# Patient Record
Sex: MALE | Race: WHITE | URBAN - METROPOLITAN AREA
[De-identification: names, ages, dates, MRNs, and addresses within clinical notes are randomized per-mention and may not be internally consistent; named-entity substitution may affect disease eponyms.]

---

## 2017-01-03 RX ORDER — CLONAZEPAM 0.5 MG/1
TABLET ORAL
Qty: 60 TABLET | Refills: 0 | Status: SHIPPED | OUTPATIENT
Start: 2017-01-03 | End: 2017-03-21 | Stop reason: SDUPTHER

## 2017-01-26 ENCOUNTER — OFFICE VISIT (OUTPATIENT)
Dept: INTERNAL MEDICINE CLINIC | Age: 32
End: 2017-01-26

## 2017-01-26 VITALS
DIASTOLIC BLOOD PRESSURE: 76 MMHG | WEIGHT: 195 LBS | TEMPERATURE: 98.3 F | SYSTOLIC BLOOD PRESSURE: 118 MMHG | BODY MASS INDEX: 30.09 KG/M2 | HEART RATE: 80 BPM

## 2017-01-26 DIAGNOSIS — K52.9 IBD (INFLAMMATORY BOWEL DISEASE): ICD-10-CM

## 2017-01-26 DIAGNOSIS — K59.01 SLOW TRANSIT CONSTIPATION: ICD-10-CM

## 2017-01-26 DIAGNOSIS — Z00.00 PREVENTATIVE HEALTH CARE: ICD-10-CM

## 2017-01-26 DIAGNOSIS — F42.9 OCD (OBSESSIVE COMPULSIVE DISORDER): Primary | ICD-10-CM

## 2017-01-26 LAB
C-REACTIVE PROTEIN: 41.2 MG/L (ref 0–5.1)
CHOLESTEROL, TOTAL: 142 MG/DL (ref 0–199)
HCT VFR BLD CALC: 42.9 % (ref 40.5–52.5)
HDLC SERPL-MCNC: 31 MG/DL (ref 40–60)
HEMOGLOBIN: 13.6 G/DL (ref 13.5–17.5)
LDL CHOLESTEROL CALCULATED: 71 MG/DL
MCH RBC QN AUTO: 26.4 PG (ref 26–34)
MCHC RBC AUTO-ENTMCNC: 31.6 G/DL (ref 31–36)
MCV RBC AUTO: 83.6 FL (ref 80–100)
PDW BLD-RTO: 15.7 % (ref 12.4–15.4)
PLATELET # BLD: 306 K/UL (ref 135–450)
PMV BLD AUTO: 9.4 FL (ref 5–10.5)
RBC # BLD: 5.14 M/UL (ref 4.2–5.9)
SEDIMENTATION RATE, ERYTHROCYTE: 40 MM/HR (ref 0–15)
TRIGL SERPL-MCNC: 200 MG/DL (ref 0–150)
VLDLC SERPL CALC-MCNC: 40 MG/DL
WBC # BLD: 9.9 K/UL (ref 4–11)

## 2017-01-26 PROCEDURE — 99213 OFFICE O/P EST LOW 20 MIN: CPT | Performed by: INTERNAL MEDICINE

## 2017-01-26 RX ORDER — MELATONIN
1 DAILY
Qty: 30 TABLET | Refills: 5 | Status: SHIPPED | OUTPATIENT
Start: 2017-01-26 | End: 2018-04-25 | Stop reason: CLARIF

## 2017-01-26 RX ORDER — CLOMIPRAMINE HYDROCHLORIDE 25 MG/1
25 CAPSULE ORAL 2 TIMES DAILY
Qty: 120 CAPSULE | Refills: 3 | Status: SHIPPED | OUTPATIENT
Start: 2017-01-26

## 2017-01-26 RX ORDER — ANTACID TABLETS 500 MG/1
1 TABLET, CHEWABLE ORAL 2 TIMES DAILY WITH MEALS
Qty: 180 TABLET | Refills: 5 | Status: SHIPPED | OUTPATIENT
Start: 2017-01-26 | End: 2018-04-25 | Stop reason: CLARIF

## 2017-01-26 RX ORDER — POLYETHYLENE GLYCOL 3350 17 G/17G
17 POWDER ORAL DAILY
Qty: 1 BOTTLE | Refills: 5 | Status: SHIPPED | OUTPATIENT
Start: 2017-01-26 | End: 2018-04-25 | Stop reason: CLARIF

## 2017-01-26 ASSESSMENT — ENCOUNTER SYMPTOMS
CONSTIPATION: 1
RESPIRATORY NEGATIVE: 1
ANAL BLEEDING: 1

## 2017-01-27 LAB
ESTIMATED AVERAGE GLUCOSE: 119.8 MG/DL
HBA1C MFR BLD: 5.8 %

## 2017-02-14 ENCOUNTER — TELEPHONE (OUTPATIENT)
Dept: INTERNAL MEDICINE CLINIC | Age: 32
End: 2017-02-14

## 2017-03-21 RX ORDER — CLONAZEPAM 0.5 MG/1
TABLET ORAL
Qty: 60 TABLET | Refills: 0 | Status: SHIPPED | OUTPATIENT
Start: 2017-03-21 | End: 2017-04-20 | Stop reason: SDUPTHER

## 2017-04-20 RX ORDER — CLONAZEPAM 0.5 MG/1
TABLET ORAL
Qty: 60 TABLET | Refills: 0 | Status: SHIPPED | OUTPATIENT
Start: 2017-04-20 | End: 2017-06-16 | Stop reason: SDUPTHER

## 2017-06-16 RX ORDER — FLUTICASONE PROPIONATE 50 MCG
SPRAY, SUSPENSION (ML) NASAL
Qty: 1 BOTTLE | Refills: 5 | Status: SHIPPED | OUTPATIENT
Start: 2017-06-16

## 2017-06-16 RX ORDER — CLONAZEPAM 0.5 MG/1
TABLET ORAL
Qty: 60 TABLET | Refills: 0 | Status: SHIPPED | OUTPATIENT
Start: 2017-06-16

## 2018-04-25 ENCOUNTER — OFFICE VISIT (OUTPATIENT)
Dept: INTERNAL MEDICINE | Age: 33
End: 2018-04-25

## 2018-04-25 VITALS
SYSTOLIC BLOOD PRESSURE: 122 MMHG | WEIGHT: 199 LBS | BODY MASS INDEX: 30.16 KG/M2 | DIASTOLIC BLOOD PRESSURE: 78 MMHG | HEIGHT: 68 IN

## 2018-04-25 DIAGNOSIS — K50.014 CROHN'S DISEASE OF SMALL INTESTINE WITH ABSCESS (HCC): ICD-10-CM

## 2018-04-25 DIAGNOSIS — M45.7 ANKYLOSING SPONDYLITIS OF LUMBOSACRAL REGION (HCC): ICD-10-CM

## 2018-04-25 DIAGNOSIS — K50.019 CROHN'S DISEASE OF SMALL INTESTINE WITH COMPLICATION (HCC): Primary | ICD-10-CM

## 2018-04-25 DIAGNOSIS — Z13.5 ENCOUNTER FOR SCREENING FOR EYE AND EAR DISORDERS: ICD-10-CM

## 2018-04-25 PROCEDURE — 99202 OFFICE O/P NEW SF 15 MIN: CPT | Performed by: INTERNAL MEDICINE

## 2018-04-25 RX ORDER — CHOLECALCIFEROL (VITAMIN D3) 1250 MCG
1 CAPSULE ORAL
COMMUNITY

## 2018-04-25 ASSESSMENT — PATIENT HEALTH QUESTIONNAIRE - PHQ9
2. FEELING DOWN, DEPRESSED OR HOPELESS: 0
SUM OF ALL RESPONSES TO PHQ9 QUESTIONS 1 & 2: 0
SUM OF ALL RESPONSES TO PHQ QUESTIONS 1-9: 0
1. LITTLE INTEREST OR PLEASURE IN DOING THINGS: 0

## 2018-05-29 ENCOUNTER — TELEPHONE (OUTPATIENT)
Dept: INTERNAL MEDICINE | Age: 33
End: 2018-05-29

## 2018-05-29 DIAGNOSIS — K50.014 CROHN'S DISEASE OF SMALL INTESTINE WITH ABSCESS (HCC): ICD-10-CM

## 2018-05-29 DIAGNOSIS — Z00.00 WELL ADULT EXAM: Primary | ICD-10-CM

## 2018-07-27 ENCOUNTER — OFFICE VISIT (OUTPATIENT)
Dept: INTERNAL MEDICINE | Age: 33
End: 2018-07-27

## 2018-07-27 VITALS
HEIGHT: 68 IN | WEIGHT: 197 LBS | SYSTOLIC BLOOD PRESSURE: 120 MMHG | BODY MASS INDEX: 29.86 KG/M2 | DIASTOLIC BLOOD PRESSURE: 78 MMHG | HEART RATE: 101 BPM

## 2018-07-27 DIAGNOSIS — Z00.00 PHYSICAL EXAM: Primary | ICD-10-CM

## 2018-07-27 DIAGNOSIS — R53.83 FATIGUE, UNSPECIFIED TYPE: ICD-10-CM

## 2018-07-27 DIAGNOSIS — K50.019 CROHN'S DISEASE OF SMALL INTESTINE WITH COMPLICATION (HCC): ICD-10-CM

## 2018-07-27 DIAGNOSIS — N52.9 ERECTILE DYSFUNCTION, UNSPECIFIED ERECTILE DYSFUNCTION TYPE: ICD-10-CM

## 2018-07-27 DIAGNOSIS — M45.7 ANKYLOSING SPONDYLITIS OF LUMBOSACRAL REGION (HCC): ICD-10-CM

## 2018-07-27 PROCEDURE — 99395 PREV VISIT EST AGE 18-39: CPT | Performed by: INTERNAL MEDICINE

## 2018-07-27 RX ORDER — SILDENAFIL 100 MG/1
100 TABLET, FILM COATED ORAL PRN
Qty: 9 TABLET | Refills: 3 | Status: SHIPPED | OUTPATIENT
Start: 2018-07-27 | End: 2022-05-16

## 2018-07-27 NOTE — PROGRESS NOTES
BMP:    Lab Results   Component Value Date     07/11/2018    K 4.7 07/11/2018    CL 99 07/11/2018    CO2 27 07/11/2018       LFT's:   Lab Results   Component Value Date    ALT 29 07/11/2018    AST 16 07/11/2018    ALKPHOS 82 07/11/2018    BILITOT 0.3 07/11/2018       Lipids:   Lab Results   Component Value Date    CHOL 162 07/11/2018    HDL 37 (L) 07/11/2018    LDLCALC 92 07/11/2018    TRIG 166 (H) 07/11/2018       INR: No results found for: INR, PROTIME    U/A:  Lab Results   Component Value Date    LABMICR Comment 07/11/2018          Lab Results   Component Value Date    LABA1C 5.8 01/26/2017        Lab Results   Component Value Date    CREATININE 0.89 07/11/2018       -----------------------------------------------------------------     Assessment/Plan:   1. Crohn's disease of small intestine with complication ([address]  This problem is stable on present treatment followed by GI  - CBC WITH AUTO DIFFERENTIAL; Future  - Testosterone male; Future  - VITAMIN B12; Future  - VITAMIN D 25 HYDROXY; Future    2. Ankylosing spondylitis of lumbosacral region [facility])  His problem is stable on Remicade  - CBC WITH AUTO DIFFERENTIAL; Future  - Testosterone male; Future  - VITAMIN B12; Future  - VITAMIN D 25 HYDROXY; Future    3. Fatigue, unspecified type  Etiology unknown rule out a result of nutritional deficiencies rule out testosterone deficiency etc.  - CBC WITH AUTO DIFFERENTIAL; Future  - Testosterone male;  Future  - VITAMIN B12; Future  - VITAMIN D 25 HYDROXY; Future   Physical exam check screening labs continue to be followed by GI will give Viagra for erectile dysfunction and check testosterone level

## 2019-02-12 ENCOUNTER — TELEPHONE (OUTPATIENT)
Dept: INTERNAL MEDICINE CLINIC | Age: 34
End: 2019-02-12

## 2019-02-20 ENCOUNTER — TELEPHONE (OUTPATIENT)
Dept: INTERNAL MEDICINE CLINIC | Age: 34
End: 2019-02-20

## 2019-04-12 ENCOUNTER — TELEPHONE (OUTPATIENT)
Dept: INTERNAL MEDICINE CLINIC | Age: 34
End: 2019-04-12

## 2019-11-22 ENCOUNTER — TELEPHONE (OUTPATIENT)
Dept: INTERNAL MEDICINE CLINIC | Age: 34
End: 2019-11-22

## 2020-06-30 ENCOUNTER — TELEPHONE (OUTPATIENT)
Dept: INTERNAL MEDICINE CLINIC | Age: 35
End: 2020-06-30

## 2020-11-05 ENCOUNTER — OFFICE VISIT (OUTPATIENT)
Dept: INTERNAL MEDICINE CLINIC | Age: 35
End: 2020-11-05
Payer: COMMERCIAL

## 2020-11-05 VITALS — SYSTOLIC BLOOD PRESSURE: 126 MMHG | BODY MASS INDEX: 30.4 KG/M2 | HEIGHT: 68 IN | DIASTOLIC BLOOD PRESSURE: 88 MMHG

## 2020-11-05 PROBLEM — K76.0 FATTY LIVER: Status: ACTIVE | Noted: 2020-11-05

## 2020-11-05 PROBLEM — K40.90 NON-RECURRENT UNILATERAL INGUINAL HERNIA WITHOUT OBSTRUCTION OR GANGRENE: Status: ACTIVE | Noted: 2020-11-05

## 2020-11-05 PROCEDURE — 99395 PREV VISIT EST AGE 18-39: CPT | Performed by: INTERNAL MEDICINE

## 2020-11-05 NOTE — PROGRESS NOTES
Annual Wellness Visit     Patient:  Angelina Manuel                                               : 1985  Age: 30 y.o. MRN: <[id number]>  Date : 2020      CHIEF COMPLAINT: Angelina Manuel is a 30 y.o. male who presents for : Physical exam    1. Obsessive-compulsive disorder, unspecified type  This problem is stable followed by psychiatry    2. Crohn's disease of small intestine with complication ([facility] [address]  Problem has been well controlled now on Remicade and followed by GI he also has been newly diagnosed as having a fatty liver and is being worked up by Dr. Cyn Gamino; Future  - SEDIMENTATION RATE; Future  - VITAMIN B12; Future  - VITAMIN D 25 HYDROXY; Future    3. Fatty liver  As above    4. PE (physical exam), annual  Only feels good denies any chest pain shortness of breath or any other problems  - CBC Auto Differential; Future  - Comprehensive Metabolic Panel; Future  - Lipid Panel; Future  - Psa screening; Future  - TSH without Reflex; Future  - Urinalysis;  Future        Patient Active Problem List    Diagnosis Date Noted    Fatty liver 2020    Ankylosing spondylitis of lumbosacral region [facility]) 2018    OCD (obsessive compulsive disorder) 2017    IBD (inflammatory bowel disease) 2017    Regional enteritis of small intestine ([facility] [address] 2016       Constitutional:  Denies fever or chills   Eyes:  Denies change in visual acuity   HENT:  Denies nasal congestion or sore throat   Respiratory:  Denies cough or shortness of breath   Cardiovascular:  Denies chest pain or edema   GI:  Denies abdominal pain, nausea, vomiting, bloody stools or diarrhea   :  Denies dysuria   Musculoskeletal:  Denies back pain or joint pain   Integument:  Denies rash   Neurologic:  Denies headache, focal weakness or sensory changes   Endocrine:  Denies polyuria or polydipsia   Lymphatic:  Denies swollen glands   Psychiatric:  Denies depression or anxiety     Past Medical History: Diagnosis Date    Ankylosing spondylitis lumbar region [facility])     Ankylosing spondylitis of lumbosacral region ([facility] 2018    Anxiety     OCD    Crohn's disease ([facility]     Depression     Fatty liver 2020    GERD (gastroesophageal reflux disease)     OCD (obsessive compulsive disorder)     Small bowel obstruction (HCC)        Past Surgical History:        Procedure Laterality Date    COLONOSCOPY         Family History:  No family history on file. Social History:  Social History     Socioeconomic History    Marital status:      Spouse name: None    Number of children: 3    Years of education: None    Highest education level: None   Occupational History    None   Social Needs    Financial resource strain: None    Food insecurity     Worry: None     Inability: None    Transportation needs     Medical: None     Non-medical: None   Tobacco Use    Smoking status: Former Smoker     Types: Cigarettes     Start date: 8/15/2008     Last attempt to quit: 1/15/2013     Years since quittin.8    Smokeless tobacco: Never Used    Tobacco comment: former smoker-light smoker,2-3 cigarettes/day   Substance and Sexual Activity    Alcohol use:  Yes     Alcohol/week: 1.0 standard drinks     Types: 1 Cans of beer per week     Comment: one beer a month     Drug use: No    Sexual activity: Yes     Comment:    Lifestyle    Physical activity     Days per week: None     Minutes per session: None    Stress: None   Relationships    Social connections     Talks on phone: None     Gets together: None     Attends Hinduism service: None     Active member of club or organization: None     Attends meetings of clubs or organizations: None     Relationship status: None    Intimate partner violence     Fear of current or ex partner: None     Emotionally abused: None     Physically abused: None     Forced sexual activity: None   Other Topics Concern    None   Social History Narrative    None Allergies:  Patient has no known allergies. Current Medications:    Prior to Admission medications    Medication Sig Start Date End Date Taking? Authorizing Provider   sildenafil (VIAGRA) 100 MG tablet Take 1 tablet by mouth as needed for Erectile Dysfunction 7/27/18   Elvin Hernandez MD   Cholecalciferol (VITAMIN D3) 30592 units CAPS Take 1 capsule by mouth every 7 days    Historical MD Atul   clonazePAM (KLONOPIN) 0.5 MG tablet TAKE ONE TABLET BY MOUTH TWICE DAILY AS NEEDED FOR ANXIETY 6/16/17   Konstantin Rosales MD   fluticasone [facility]) 50 MCG/ACT nasal spray USE ONE SPRAY(S) IN EACH NOSTRIL TWICE DAILY 6/16/17   Konstantin Rosales MD   clomiPRAMINE (ANAFRANIL) 25 MG capsule Take 1 capsule by mouth 2 times daily 1/26/17   Konstantin Rosales MD   FLUoxetine (PROZAC) 40 MG capsule Take 2 capsules by mouth daily 9/22/16   Konstantin Rosales MD   inFLIXimab (REMICADE) 100 MG injection Infuse 5 mg/kg intravenously See Admin Instructions Every 6 weeks    Historical Provider, MD           Physical Exam:      Constitutional:  Well developed, well nourished, no acute distress, non-toxic appearance   Eyes:  PERRL, conjunctiva normal   HENT:  Atraumatic, external ears normal, nose normal, oropharynx moist, no pharyngeal exudates. Neck- normal range of motion, no tenderness, supple   Respiratory:  No respiratory distress, normal breath sounds, no rales, no wheezing   Cardiovascular:  Normal rate, normal rhythm, no murmurs, no gallops, no rubs   GI:  Soft, nondistended, normal bowel sounds, nontender, no organomegaly, no mass, no rebound, no guarding   :  No costovertebral angle tenderness easily reducible left inguinal hernia  Musculoskeletal:  No edema, no tenderness, no deformities.  Back- no tenderness  Integument:  Well hydrated, no rash   Lymphatic:  No lymphadenopathy noted   Neurologic:  Alert & oriented x 3, CN 2-12 normal, normal motor function, normal sensory function, no focal deficits noted Psychiatric:  Speech and behavior appropriate       Vitals: /88   Ht 5' 7.5\" (1.715 m)   BMI 30.40 kg/m²     Body mass index is 30.4 kg/m². Wt Readings from Last 3 Encounters:   07/27/18 197 lb (89.4 kg)   04/25/18 199 lb (90.3 kg)   01/26/17 195 lb (88.5 kg)         LABS:    CBC:   Lab Results   Component Value Date    WBC 13.5 (H) 08/02/2018    HGB 13.7 08/02/2018    HCT 43.1 08/02/2018    MCV 86 08/02/2018     08/02/2018           Lab Results   Component Value Date    DTCXJIXH93 349 08/02/2018                                                             BMP:    Lab Results   Component Value Date     07/11/2018    K 4.7 07/11/2018    CL 99 07/11/2018    CO2 27 07/11/2018       LFT's:   Lab Results   Component Value Date    ALT 29 07/11/2018    AST 16 07/11/2018    ALKPHOS 82 07/11/2018    BILITOT 0.3 07/11/2018       Lipids:   Lab Results   Component Value Date    CHOL 162 07/11/2018    HDL 37 (L) 07/11/2018    LDLCALC 92 07/11/2018    TRIG 166 (H) 07/11/2018       INR: No results found for: INR, PROTIME    U/A:  Lab Results   Component Value Date    LABMICR Comment 07/11/2018          Lab Results   Component Value Date    LABA1C 5.8 01/26/2017        Lab Results   Component Value Date    CREATININE 0.89 07/11/2018       -----------------------------------------------------------------     Assessment/Plan:   1. Obsessive-compulsive disorder, unspecified type  This problem is stable continue present meds    2. Crohn's disease of small intestine with complication ([address]  This problem is stable followed by GI currently on Remicade  - C-REACTIVE PROTEIN; Future  - SEDIMENTATION RATE; Future  - VITAMIN B12; Future  - VITAMIN D 25 HYDROXY; Future    3. Fatty liver  This problem is stable currently being worked up by hepatology strongly encouraged to lose about 10% of his weight    4.  PE (physical exam), annual  Check screening labs he is up-to-date on all his immunizations continue diet and

## 2020-12-02 ENCOUNTER — TELEPHONE (OUTPATIENT)
Dept: INTERNAL MEDICINE CLINIC | Age: 35
End: 2020-12-02

## 2020-12-04 ENCOUNTER — TELEPHONE (OUTPATIENT)
Dept: INTERNAL MEDICINE CLINIC | Age: 35
End: 2020-12-04

## 2020-12-04 NOTE — TELEPHONE ENCOUNTER
Waiting on results to come by fax. Results reviewed. Spoke with spouse regarding results. See liver specialist.    Watch diet. Can see dietician.

## 2021-04-16 ENCOUNTER — TELEPHONE (OUTPATIENT)
Dept: INTERNAL MEDICINE CLINIC | Age: 36
End: 2021-04-16

## 2021-04-16 NOTE — TELEPHONE ENCOUNTER
Per physician    Should go to [facility] ( patient's gi specialist Sushant Izquierdo) for treatment of covid. Spouse notified.

## 2021-04-16 NOTE — TELEPHONE ENCOUNTER
Patient wife called stating patient just tested positive today for COVID 19. He had a rapid test done at his work. They want to know what he recommends? Please call they have a lot of questions, and needs to know if he needs to antibody treatment? She was told if he does he needs to get this as soon as possible . Please call to advise.

## 2021-04-21 ENCOUNTER — OFFICE VISIT (OUTPATIENT)
Dept: INTERNAL MEDICINE CLINIC | Age: 36
End: 2021-04-21
Payer: COMMERCIAL

## 2021-04-21 VITALS — HEART RATE: 96 BPM

## 2021-04-21 DIAGNOSIS — U07.1 COVID-19 VIRUS INFECTION: Primary | ICD-10-CM

## 2021-04-21 PROCEDURE — 99213 OFFICE O/P EST LOW 20 MIN: CPT | Performed by: INTERNAL MEDICINE

## 2021-04-21 SDOH — ECONOMIC STABILITY: TRANSPORTATION INSECURITY
IN THE PAST 12 MONTHS, HAS THE LACK OF TRANSPORTATION KEPT YOU FROM MEDICAL APPOINTMENTS OR FROM GETTING MEDICATIONS?: NO

## 2021-04-21 SDOH — ECONOMIC STABILITY: TRANSPORTATION INSECURITY
IN THE PAST 12 MONTHS, HAS LACK OF TRANSPORTATION KEPT YOU FROM MEETINGS, WORK, OR FROM GETTING THINGS NEEDED FOR DAILY LIVING?: NO

## 2021-04-21 SDOH — ECONOMIC STABILITY: INCOME INSECURITY: HOW HARD IS IT FOR YOU TO PAY FOR THE VERY BASICS LIKE FOOD, HOUSING, MEDICAL CARE, AND HEATING?: NOT HARD AT ALL

## 2021-04-21 SDOH — ECONOMIC STABILITY: FOOD INSECURITY: WITHIN THE PAST 12 MONTHS, THE FOOD YOU BOUGHT JUST DIDN'T LAST AND YOU DIDN'T HAVE MONEY TO GET MORE.: NEVER TRUE

## 2021-04-21 ASSESSMENT — PATIENT HEALTH QUESTIONNAIRE - PHQ9
1. LITTLE INTEREST OR PLEASURE IN DOING THINGS: 0
SUM OF ALL RESPONSES TO PHQ QUESTIONS 1-9: 0
SUM OF ALL RESPONSES TO PHQ QUESTIONS 1-9: 0
2. FEELING DOWN, DEPRESSED OR HOPELESS: 0

## 2021-04-21 NOTE — PROGRESS NOTES
2021    TELEHEALTH EVALUATION -- Audio/Visual (During UBYUI-72 public health emergency)    HPI: Follow-up COVID-19 infection status post monoclonal antibody infusion    Tatyana Balbuena (:  1985) has requested an audio/video evaluation for the following concern(s):    Follow-up COVID-19 infection he went to [facility] with chest x-ray showed early pneumonia he is having some productive cough but feels better he did get the monoclonal antibodies 2 days later she had no fever chills his pulse ox is remained over 96%  He did have the Frost Peter vaccine in January  Review of Systems no fevers chills shortness of breath    Prior to Visit Medications    Medication Sig Taking? Authorizing Provider   sildenafil (VIAGRA) 100 MG tablet Take 1 tablet by mouth as needed for Erectile Dysfunction Yes Gary Guillen MD   Cholecalciferol (VITAMIN D3) 08179 units CAPS Take 1 capsule by mouth every 7 days Yes Historical Provider, MD   clonazePAM (KLONOPIN) 0.5 MG tablet TAKE ONE TABLET BY MOUTH TWICE DAILY AS NEEDED FOR ANXIETY Yes Joshua Mccormack MD   fluticasone (FLONASE) 50 MCG/ACT nasal spray USE ONE SPRAY(S) IN EACH NOSTRIL TWICE DAILY Yes Joshua Mccormack MD   clomiPRAMINE (ANAFRANIL) 25 MG capsule Take 1 capsule by mouth 2 times daily Yes Joshua Mccormack MD   FLUoxetine (PROZAC) 40 MG capsule Take 2 capsules by mouth daily Yes Joshua Mccormack MD   inFLIXimab (REMICADE) 100 MG injection Infuse 5 mg/kg intravenously See Admin Instructions Every 6 weeks Yes Historical Provider, MD       Social History     Tobacco Use    Smoking status: Former Smoker     Types: Cigarettes     Start date: 8/15/2008     Quit date: 1/15/2013     Years since quittin.2    Smokeless tobacco: Never Used    Tobacco comment: former smoker-light smoker,2-3 cigarettes/day   Substance Use Topics    Alcohol use:  Yes     Alcohol/week: 1.0 standard drinks     Types: 1 Cans of beer per week     Comment: one beer a month     Drug use: No        Past Medical History:   Diagnosis Date    [facility]yl[facility]ond[facility] lumbar [email or web address]     Ankylosing spondylitis of lumbosacral region ([email or web address] 4/25/2018    Anxiety     OCD    Crohn's disease ([email or web address]     Depression     Fatty liver 11/5/2020    GERD (gastroesophageal reflux disease)     OCD (obsessive compulsive disorder)     Small bowel obstruction (HCC)        PHYSICAL EXAMINATION:  [ INSTRUCTIONS:  \"[x]\" Indicates a positive item  \"[]\" Indicates a negative item  -- DELETE ALL ITEMS NOT EXAMINED]  Vital Signs: (As obtained by patient/caregiver or practitioner observation)    Blood pressure-  Heart rate-    Respiratory rate-    Temperature-  Pulse oximetry-     Constitutional: [x] Appears well-developed and well-nourished [x] No apparent distress      [] Abnormal-   Mental status  [x] Alert and awake  [x] Oriented to person/place/time []Able to follow commands      Eyes:  EOM    []  Normal  [] Abnormal-  Sclera  []  Normal  [] Abnormal -         Discharge []  None visible  [] Abnormal -    HENT:   [x] Normocephalic, atraumatic.   [] Abnormal   [] Mouth/Throat: Mucous membranes are moist.     External Ears [] Normal  [] Abnormal-     Neck: [] No visualized mass     Pulmonary/Chest: [x] Respiratory effort normal.  [x] No visualized signs of difficulty breathing or respiratory distress        [] Abnormal-      Musculoskeletal:   [] Normal gait with no signs of ataxia         [] Normal range of motion of neck        [] Abnormal-       Neurological:        [x] No Facial Asymmetry (Cranial nerve 7 motor function) (limited exam to video visit)          [] No gaze palsy        [] Abnormal-         Skin:        [] No significant exanthematous lesions or discoloration noted on facial skin         [] Abnormal-            Psychiatric:       [x] Normal Affect [] No Hallucinations        [] Abnormal-     Other pertinent observable physical exam findings-     ASSESSMENT/PLAN:  COVID-19 viral infection now clinically improved with evidence of pneumonitis but no other significant problems status post immunotherapy status post having the vaccine about 2 months earlier he was instructed to isolate for minimum of 10 days to use Robitussin-DM and Tylenol and supportive care as needed and to call if his pulse ox gets below 92% in the interim will report his case to the health department as he had completion of the vaccine and got a documented infection        Marvin Cotton, was evaluated through a synchronous (real-time) audio-video encounter. The patient (or guardian if applicable) is aware that this is a billable service. Verbal consent to proceed has been obtained within the past 12 months. The visit was conducted pursuant to the emergency declaration under the [address] [place] [address] authority and the [employer] Act. Patient identification was verified, and a caregiver was present when appropriate. The patient was located in a state where the provider was credentialed to provide care. Total time spent on this encounter: Not billed by time    --Fátima Loya MD on 4/21/2021 at 5:05 PM    An electronic signature was used to authenticate this note.

## 2021-05-12 ENCOUNTER — TELEPHONE (OUTPATIENT)
Dept: INTERNAL MEDICINE CLINIC | Age: 36
End: 2021-05-12

## 2021-05-12 NOTE — TELEPHONE ENCOUNTER
Non-Urgent Medical Question    From   Livier Carty To   [facility] Practice Support Sent   5/12/2021 11:42 AM   Hi, I will be travelling to [facility] soon, and for re-entry to the [address] [place] [address],[address], I need documentation that I have recently recovered from covid. (I tested positive on April 16, 2021.) This is the exact language from the airlines:     \". .. a signed letter on letterhead from a licensed healthcare provider or public health official that the passenger has recovered from [phone] and is cleared for travel. \"     Can you please provide? Thank you!

## 2021-05-12 NOTE — TELEPHONE ENCOUNTER
Divine  as long as it has been 10 day since symptoms and is symptom free    Spoke to spouse, will email letter to her. Patient is symptom free and has been for over 10 days. Letter completed.

## 2021-05-12 NOTE — LETTER
[facility] [address]  [address] [address]  Phone: [phone]  Fax: [phone]    Angelica Neil MD        May 14, 2021     Patient: Joce Tena   YOB: 1985   Date of Visit: 04/21/2021       To Whom It May Concern:    My patient Joce Tena has recovered from having Covid-19 and he is medically cleared to travel. If you have any questions or concerns, please don't hesitate to call.     Sincerely,        Angelica Neil MD   State License # [id number]

## 2021-07-16 ENCOUNTER — TELEPHONE (OUTPATIENT)
Dept: INTERNAL MEDICINE CLINIC | Age: 36
End: 2021-07-16

## 2021-07-16 NOTE — TELEPHONE ENCOUNTER
Spoke to pt spouse, informed that ABX will be sent it. She states that she wants him to be tested first. Advised that he would need to be seen in urgent care or go to a [facility] testing site to get swabbed. Declined to have med's sent in. Will call back if needed.

## 2021-07-16 NOTE — TELEPHONE ENCOUNTER
Patient wife called stating patient thinks he has strept throat. He just has a bad sore throat and no other symptoms. They believe he has strept and wants him to get tested for this since he has Crohn disease and does not want to take an antibiotic unless he has too! Please call to advise.

## 2021-09-15 ENCOUNTER — TELEPHONE (OUTPATIENT)
Dept: INTERNAL MEDICINE CLINIC | Age: 36
End: 2021-09-15

## 2021-09-15 NOTE — TELEPHONE ENCOUNTER
Pt's wife called into the office to request a call back from office regarding the following information. She declined to give anymore information about the medication questions. clonazePAM (KLONOPIN) 0.5 MG tablet     fluticasone (FLONASE) 50 MCG/ACT nasal spray     clomiPRAMINE (ANAFRANIL) 25 MG capsule         Please advise.

## 2021-10-01 ENCOUNTER — TELEPHONE (OUTPATIENT)
Dept: INTERNAL MEDICINE CLINIC | Age: 36
End: 2021-10-01

## 2021-10-01 NOTE — TELEPHONE ENCOUNTER
Non-Urgent Medical Question    Yecenia Shahid MD 25 minutes ago (2:00 PM)   ER  Viola Canchola Nissa Solomon had his Frost Peter shots in January 2021. He is still on Remicade every 6 weeks and got sick with covid in April 2021 and had the monoclonal infusion. He now has an opportunity to get the booster shot. Would you recommend it or is it not necessary because of his April illness and antibody infusion?

## 2022-03-09 LAB
ALBUMIN SERPL-MCNC: 4.8 G/DL
ALBUMIN SERPL-MCNC: NORMAL G/DL
ALP BLD-CCNC: 50 U/L
ALP BLD-CCNC: 50 U/L
ALT SERPL-CCNC: 33 U/L
ALT SERPL-CCNC: 33 U/L
ANION GAP SERPL CALCULATED.3IONS-SCNC: NORMAL MMOL/L
ANION GAP SERPL CALCULATED.3IONS-SCNC: NORMAL MMOL/L
AST SERPL-CCNC: 19 U/L
AST SERPL-CCNC: 19 U/L
AVERAGE GLUCOSE: NORMAL
BILIRUB SERPL-MCNC: 0.3 MG/DL (ref 0.1–1.4)
BILIRUB SERPL-MCNC: 0.3 MG/DL (ref 0.1–1.4)
BUN BLDV-MCNC: 19 MG/DL
BUN BLDV-MCNC: 19 MG/DL
CALCIUM SERPL-MCNC: NORMAL MG/DL
CALCIUM SERPL-MCNC: NORMAL MG/DL
CHLORIDE BLD-SCNC: NORMAL MMOL/L
CHLORIDE BLD-SCNC: NORMAL MMOL/L
CHOLESTEROL, TOTAL: 174 MG/DL
CHOLESTEROL/HDL RATIO: 3.6
CO2: NORMAL
CO2: NORMAL
CREAT SERPL-MCNC: 1 MG/DL
CREAT SERPL-MCNC: 1 MG/DL
CREATININE, URINE: 14
GFR CALCULATED: NORMAL
GFR CALCULATED: NORMAL
GLUCOSE BLD-MCNC: NORMAL MG/DL
GLUCOSE BLD-MCNC: NORMAL MG/DL
HBA1C MFR BLD: 5.4 %
HDLC SERPL-MCNC: 48 MG/DL (ref 35–70)
LDL CHOLESTEROL CALCULATED: 100 MG/DL (ref 0–160)
MICROALBUMIN/CREAT 24H UR: NORMAL MG/G{CREAT}
MICROALBUMIN/CREAT UR-RTO: NORMAL
NONHDLC SERPL-MCNC: NORMAL MG/DL
POTASSIUM SERPL-SCNC: NORMAL MMOL/L
POTASSIUM SERPL-SCNC: NORMAL MMOL/L
SODIUM BLD-SCNC: NORMAL MMOL/L
SODIUM BLD-SCNC: NORMAL MMOL/L
TOTAL PROTEIN: 7.9
TOTAL PROTEIN: 7.9
TRIGL SERPL-MCNC: 128 MG/DL
VLDLC SERPL CALC-MCNC: NORMAL MG/DL

## 2022-03-18 ENCOUNTER — PATIENT MESSAGE (OUTPATIENT)
Dept: INTERNAL MEDICINE CLINIC | Age: 37
End: 2022-03-18

## 2022-03-22 ENCOUNTER — TELEPHONE (OUTPATIENT)
Dept: INTERNAL MEDICINE CLINIC | Age: 37
End: 2022-03-22

## 2022-03-22 NOTE — TELEPHONE ENCOUNTER
Blayne Avery MD 4 days ago     ER      Hi, I just had to do some bloodwork for a life insurance evaluation and everything came back normal besides for 2 things:  Urine creatinine - 14mg/dl  Protein/Creatinine Ratio - 0.21 mg/mg     Do I need to be concerned about these?

## 2022-03-22 NOTE — TELEPHONE ENCOUNTER
Dom Hernandez MD 13 minutes ago (2:27 PM)     ER      It's normal.         You  Dary Emeli Martin 22 minutes ago (2:18 PM)     MW      Dr. Spike Garibay wants to know how your blood pressure is          Dom Hernandez MD 2 hours ago (12:28 PM)     ER      Hi, making sure you saw this message?          Dom Hernandez MD 4 days ago     ER      Hi, I just had to do some bloodwork for a life insurance evaluation and everything came back normal besides for 2 things:  Urine creatinine - 14mg/dl  Protein/Creatinine Ratio - 0.21 mg/mg     Do I need to be concerned about these

## 2022-05-16 ENCOUNTER — OFFICE VISIT (OUTPATIENT)
Dept: INTERNAL MEDICINE CLINIC | Age: 37
End: 2022-05-16
Payer: COMMERCIAL

## 2022-05-16 VITALS
DIASTOLIC BLOOD PRESSURE: 74 MMHG | OXYGEN SATURATION: 97 % | WEIGHT: 189 LBS | HEIGHT: 67 IN | BODY MASS INDEX: 29.66 KG/M2 | HEART RATE: 97 BPM | SYSTOLIC BLOOD PRESSURE: 114 MMHG

## 2022-05-16 DIAGNOSIS — R93.7 ABNORMAL BONE DENSITY SCREENING: ICD-10-CM

## 2022-05-16 DIAGNOSIS — K50.019 CROHN'S DISEASE OF SMALL INTESTINE WITH COMPLICATION (HCC): Primary | ICD-10-CM

## 2022-05-16 DIAGNOSIS — E55.9 VITAMIN D DEFICIENCY: ICD-10-CM

## 2022-05-16 DIAGNOSIS — E53.8 B12 DEFICIENCY: ICD-10-CM

## 2022-05-16 DIAGNOSIS — Z80.9 FAMILY HISTORY OF CANCER: ICD-10-CM

## 2022-05-16 PROCEDURE — 99214 OFFICE O/P EST MOD 30 MIN: CPT | Performed by: INTERNAL MEDICINE

## 2022-05-16 RX ORDER — INFLIXIMAB 100 MG/10ML
5 INJECTION, POWDER, LYOPHILIZED, FOR SOLUTION INTRAVENOUS SEE ADMIN INSTRUCTIONS
Qty: 1 EACH | Refills: 4
Start: 2022-05-16

## 2022-05-16 RX ORDER — LANOLIN ALCOHOL/MO/W.PET/CERES
1000 CREAM (GRAM) TOPICAL DAILY
Qty: 30 TABLET | Refills: 3 | Status: CANCELLED | OUTPATIENT
Start: 2022-05-16

## 2022-05-16 SDOH — ECONOMIC STABILITY: FOOD INSECURITY: WITHIN THE PAST 12 MONTHS, YOU WORRIED THAT YOUR FOOD WOULD RUN OUT BEFORE YOU GOT MONEY TO BUY MORE.: NEVER TRUE

## 2022-05-16 SDOH — ECONOMIC STABILITY: FOOD INSECURITY: WITHIN THE PAST 12 MONTHS, THE FOOD YOU BOUGHT JUST DIDN'T LAST AND YOU DIDN'T HAVE MONEY TO GET MORE.: NEVER TRUE

## 2022-05-16 ASSESSMENT — PATIENT HEALTH QUESTIONNAIRE - PHQ9
SUM OF ALL RESPONSES TO PHQ9 QUESTIONS 1 & 2: 0
1. LITTLE INTEREST OR PLEASURE IN DOING THINGS: 0
2. FEELING DOWN, DEPRESSED OR HOPELESS: 0
SUM OF ALL RESPONSES TO PHQ QUESTIONS 1-9: 0

## 2022-05-16 ASSESSMENT — SOCIAL DETERMINANTS OF HEALTH (SDOH): HOW HARD IS IT FOR YOU TO PAY FOR THE VERY BASICS LIKE FOOD, HOUSING, MEDICAL CARE, AND HEATING?: NOT HARD AT ALL

## 2022-05-16 NOTE — PROGRESS NOTES
Lakesha Pérez (:  1985) is a 40 y.o. male, here for evaluation of the following chief complaint(s):    Established New Doctor      ASSESSMENT/PLAN:  1. Crohn's disease of small intestine with complication ([facility] [address]  -    Since surgery, stable on inFLIXimab (REMICADE) 100 MG injection; Infuse 40 mLs intravenously See Admin Instructions, Disp-1 each, R-4NO PRINT  -     DEXA BONE DENSITY AXIAL SKELETON; Future  2. Family history of cancer  -     [facility]  3. Abnormal bone density screening  -     DEXA BONE DENSITY AXIAL SKELETON; Future  4. Vitamin D deficiency  Takes Vitamin D 50,000 units weekly  5. B12 deficiency  -    Add  cyanocobalamin 1000 MCG tablet; Take 1 tablet by mouth daily, Disp-90 tablet, R-1OTC  6. OCD-stable on clomipramine, fluoxetine, clonazepam.  He will discuss further with his psychiatrist the possible side effects of reduced libido. Return in about 1 year (around 2023). SUBJECTIVE/OBJECTIVE:  HPI   Patient is here to transfer care. He has a history of Crohn's disease, diagnosed approximately . He had abdominal surgery in 2019 and has felt much better since that time. He is on Remicade to maintain remission. He sees Dr. Larry Wise. He maintains a strict paleo diet. He used to have significant back pain until his Crohn's was treated. He has a regular psychiatrist-Dr. Juan Kinsey for his OCD. He feels the symptoms are well controlled on current medications but he does have a side effect of reduced libido. Patient states his weight is fairly stable and he does not have significant abdominal pain. Reviewed life insurance labs.  Overall appear within normal limits      Past Medical History:   Diagnosis Date    Allergic rhinitis     Ankylosing spondylitis of lumbosacral region ([address] 2018    improved    Anxiety     OCD    Crohn's disease ([facility] [address]     dx'd approx - Kreines    Depression     Fatty liver 2020    GERD (gastroesophageal reflux disease)     OCD (obsessive compulsive disorder)     dr Pat Puri    Small bowel obstruction [facility])        Current Outpatient Medications   Medication Sig Dispense Refill    cyanocobalamin 1000 MCG tablet Take 1 tablet by mouth daily 90 tablet 1    inFLIXimab (REMICADE) 100 MG injection Infuse 40 mLs intravenously See Admin Instructions 1 each 4    Cholecalciferol (VITAMIN D3) 24088 units CAPS Take 1 capsule by mouth every 7 days      clonazePAM (KLONOPIN) 0.5 MG tablet TAKE ONE TABLET BY MOUTH TWICE DAILY AS NEEDED FOR ANXIETY 60 tablet 0    fluticasone (FLONASE) 50 MCG/ACT nasal spray USE ONE SPRAY(S) IN EACH NOSTRIL TWICE DAILY 1 Bottle 5    clomiPRAMINE (ANAFRANIL) 25 MG capsule Take 1 capsule by mouth 2 times daily (Patient taking differently: Take 25 mg by mouth 3 times daily ) 120 capsule 3    FLUoxetine (PROZAC) 40 MG capsule Take 2 capsules by mouth daily 120 capsule 5     No current facility-administered medications for this visit. Physical Exam  Vitals and nursing note reviewed. Constitutional:       General: He is not in acute distress. Appearance: He is well-developed. HENT:      Head: Normocephalic and atraumatic. Right Ear: External ear normal.      Left Ear: External ear normal.   Eyes:      General: No scleral icterus. Extraocular Movements: Extraocular movements intact. Neck:      Thyroid: No thyromegaly. Cardiovascular:      Rate and Rhythm: Normal rate and regular rhythm. Heart sounds: No murmur heard. Pulmonary:      Effort: No respiratory distress. Breath sounds: Normal breath sounds. No wheezing or rales. Abdominal:      General: Bowel sounds are normal. There is no distension. Palpations: Abdomen is soft. Tenderness: There is no abdominal tenderness. Comments: Mid abdomen scar   Musculoskeletal:         General: No deformity. Normal range of motion. Cervical back: Normal range of motion. Right lower leg: No edema. Left lower leg: No edema. Lymphadenopathy:      Cervical: No cervical adenopathy. Skin:     General: Skin is warm and dry. Neurological:      Mental Status: He is alert and oriented to person, place, and time. Cranial Nerves: No cranial nerve deficit. Sensory: No sensory deficit. Coordination: Coordination normal.   Psychiatric:         Thought Content: Thought content normal.               This note was generated completely or in part utilizing Dragon dictation speech recognition software. Occasionally, words are mistranscribed and despite editing, the text may contain inaccuracies due to incorrect word recognition. If further clarification is needed please contact the office at [phone]          An electronic signature was used to authenticate this note.     --Amy Herrera MD

## 2022-05-17 PROBLEM — K50.90 CROHN'S DISEASE (HCC): Status: ACTIVE | Noted: 2022-05-17

## 2023-10-23 ENCOUNTER — TELEPHONE (OUTPATIENT)
Dept: INTERNAL MEDICINE CLINIC | Age: 38
End: 2023-10-23

## 2023-10-23 NOTE — TELEPHONE ENCOUNTER
Patient requesting an appointment for Rash. Advised to submit an evisit questionnaire.        Appointment Request From: Dyllan Estevez      With Provider: Osman Tinoco MD [facility]      Preferred Date Range: Any date 11/2/2023 or later      Preferred Times: Any Time      Reason for visit: Office Visit      Comments:   skin rash

## 2024-01-11 ENCOUNTER — TELEMEDICINE (OUTPATIENT)
Dept: INTERNAL MEDICINE CLINIC | Age: 39
End: 2024-01-11
Payer: COMMERCIAL

## 2024-01-11 ENCOUNTER — TELEPHONE (OUTPATIENT)
Dept: INTERNAL MEDICINE CLINIC | Age: 39
End: 2024-01-11

## 2024-01-11 DIAGNOSIS — R68.89 FLU-LIKE SYMPTOMS: Primary | ICD-10-CM

## 2024-01-11 DIAGNOSIS — J10.1 INFLUENZA A: Primary | ICD-10-CM

## 2024-01-11 LAB
INFLUENZA A ANTIBODY: POSITIVE
INFLUENZA B ANTIBODY: NEGATIVE
S PYO AG THROAT QL: NORMAL

## 2024-01-11 PROCEDURE — 99213 OFFICE O/P EST LOW 20 MIN: CPT | Performed by: INTERNAL MEDICINE

## 2024-01-11 PROCEDURE — 87804 INFLUENZA ASSAY W/OPTIC: CPT | Performed by: INTERNAL MEDICINE

## 2024-01-11 PROCEDURE — 87880 STREP A ASSAY W/OPTIC: CPT | Performed by: INTERNAL MEDICINE

## 2024-01-11 RX ORDER — ALBUTEROL SULFATE 90 UG/1
2 AEROSOL, METERED RESPIRATORY (INHALATION) 4 TIMES DAILY PRN
Qty: 18 G | Refills: 5 | Status: SHIPPED | OUTPATIENT
Start: 2024-01-11

## 2024-01-11 RX ORDER — BUDESONIDE AND FORMOTEROL FUMARATE DIHYDRATE 160; 4.5 UG/1; UG/1
2 AEROSOL RESPIRATORY (INHALATION) 2 TIMES DAILY
Qty: 1 EACH | Refills: 0 | Status: SHIPPED | OUTPATIENT
Start: 2024-01-11

## 2024-01-11 NOTE — TELEPHONE ENCOUNTER
If he can stop by before 5 PM for a nurse visit, we can do a flu test, and then he should do a VV with me at 5 PM today.

## 2024-01-11 NOTE — TELEPHONE ENCOUNTER
Patient is calling with concerns of fever, tightness in chest, wheezing, HA, achiness & nauseous.  He has taken several Covid tests and was negative. Please advise on scheduling.   Can contact patient at the number provider or his wife at [phone]

## 2024-01-11 NOTE — PROGRESS NOTES
Emeli Worthy (:  1985) is a 38 y.o. male,Established patient, here for evaluation of the following chief complaint(s): Cough and Congestion      ASSESSMENT/PLAN:  1. Influenza A  Likely too late to get effectiveness from Tamiflu. Advised rest, fluids, Tylenol as needed.  -     albuterol sulfate HFA (VENTOLIN HFA) 108 (90 Base) MCG/ACT inhaler; Inhale 2 puffs into the lungs 4 times daily as needed for Wheezing, Disp-18 g, R-5Normal  -     budesonide-formoterol (SYMBICORT) 160-4.5 MCG/ACT AERO; Inhale 2 puffs into the lungs 2 times daily, Disp-1 each, R-0Normal      Return if symptoms worsen or fail to improve.    SUBJECTIVE/OBJECTIVE:  HPI  Patient started feeling unwell this past Saturday with headaches, then developed on /Monday achiness and back pain and chest tightness and wheezing and sore throat, congestion and shivers. Thought it was a bad cold but feels terrible. Covid test negative today. Flu A test here positive.    Has Crohn's related infusion on 1/15 pending.    Review of Systems         No data to display                  Past Medical History:   Diagnosis Date    Allergic rhinitis     Ankylosing spondylitis of lumbosacral region ([facility] 2018    improved    Anxiety     OCD    COVID-19 2021    Crohn's disease ([facility])     dx'd approx - Kreines    Depression     Fatty liver 2020    GERD (gastroesophageal reflux disease)     OCD (obsessive compulsive disorder)     dr agustina winchester    Small bowel obstruction ([facility])        Current Outpatient Medications   Medication Sig Dispense Refill    albuterol sulfate HFA (VENTOLIN HFA) 108 (90 Base) MCG/ACT inhaler Inhale 2 puffs into the lungs 4 times daily as needed for Wheezing 18 g 5    budesonide-formoterol (SYMBICORT) 160-4.5 MCG/ACT AERO Inhale 2 puffs into the lungs 2 times daily 1 each 0    cyanocobalamin 1000 MCG tablet Take 1 tablet by mouth daily 90 tablet 1    inFLIXimab (REMICADE) 100 MG injection Infuse 40 mLs intravenously

## 2024-01-11 NOTE — PATIENT INSTRUCTIONS
Send mychart message with home vitals including temp, pulse ox, pulse    Albuterol or symbicort (preferred)

## 2024-06-24 ASSESSMENT — PATIENT HEALTH QUESTIONNAIRE - PHQ9
SUM OF ALL RESPONSES TO PHQ QUESTIONS 1-9: 0
SUM OF ALL RESPONSES TO PHQ QUESTIONS 1-9: 0
SUM OF ALL RESPONSES TO PHQ9 QUESTIONS 1 & 2: 0
2. FEELING DOWN, DEPRESSED OR HOPELESS: NOT AT ALL
SUM OF ALL RESPONSES TO PHQ9 QUESTIONS 1 & 2: 0
SUM OF ALL RESPONSES TO PHQ QUESTIONS 1-9: 0
1. LITTLE INTEREST OR PLEASURE IN DOING THINGS: NOT AT ALL
SUM OF ALL RESPONSES TO PHQ QUESTIONS 1-9: 0
2. FEELING DOWN, DEPRESSED OR HOPELESS: NOT AT ALL
1. LITTLE INTEREST OR PLEASURE IN DOING THINGS: NOT AT ALL

## 2024-06-27 ENCOUNTER — OFFICE VISIT (OUTPATIENT)
Dept: INTERNAL MEDICINE CLINIC | Age: 39
End: 2024-06-27
Payer: COMMERCIAL

## 2024-06-27 VITALS
HEART RATE: 96 BPM | WEIGHT: 196 LBS | SYSTOLIC BLOOD PRESSURE: 120 MMHG | HEIGHT: 68 IN | OXYGEN SATURATION: 97 % | BODY MASS INDEX: 29.7 KG/M2 | DIASTOLIC BLOOD PRESSURE: 82 MMHG

## 2024-06-27 DIAGNOSIS — K50.919 CROHN'S DISEASE WITH COMPLICATION, UNSPECIFIED GASTROINTESTINAL TRACT LOCATION (HCC): ICD-10-CM

## 2024-06-27 DIAGNOSIS — Z71.89 ACP (ADVANCE CARE PLANNING): Primary | ICD-10-CM

## 2024-06-27 PROCEDURE — 99395 PREV VISIT EST AGE 18-39: CPT | Performed by: INTERNAL MEDICINE

## 2024-06-27 SDOH — ECONOMIC STABILITY: INCOME INSECURITY: HOW HARD IS IT FOR YOU TO PAY FOR THE VERY BASICS LIKE FOOD, HOUSING, MEDICAL CARE, AND HEATING?: NOT HARD AT ALL

## 2024-06-27 SDOH — ECONOMIC STABILITY: FOOD INSECURITY: WITHIN THE PAST 12 MONTHS, THE FOOD YOU BOUGHT JUST DIDN'T LAST AND YOU DIDN'T HAVE MONEY TO GET MORE.: NEVER TRUE

## 2024-06-27 SDOH — ECONOMIC STABILITY: FOOD INSECURITY: WITHIN THE PAST 12 MONTHS, YOU WORRIED THAT YOUR FOOD WOULD RUN OUT BEFORE YOU GOT MONEY TO BUY MORE.: NEVER TRUE

## 2024-06-27 SDOH — ECONOMIC STABILITY: HOUSING INSECURITY
IN THE LAST 12 MONTHS, WAS THERE A TIME WHEN YOU DID NOT HAVE A STEADY PLACE TO SLEEP OR SLEPT IN A SHELTER (INCLUDING NOW)?: NO

## 2024-06-27 NOTE — PROGRESS NOTES
Signs  /82   Pulse 96   Ht 1.727 m (5' 8\")   Wt 88.9 kg (196 lb)   SpO2 97%   BMI 29.80 kg/m²   Wt Readings from Last 3 Encounters:   06/27/24 88.9 kg (196 lb)   05/16/22 85.7 kg (189 lb)   07/27/18 89.4 kg (197 lb)       Waist Circumference  There were no vitals filed for this visit.    Physical Exam  Vitals and nursing note reviewed.   Constitutional:       General: He is not in acute distress.     Appearance: He is well-developed.   HENT:      Head: Normocephalic and atraumatic.      Right Ear: External ear normal.      Left Ear: External ear normal.      Nose: Nose normal.   Eyes:      General: No scleral icterus.     Pupils: Pupils are equal, round, and reactive to light.   Neck:      Thyroid: No thyromegaly.   Cardiovascular:      Rate and Rhythm: Normal rate and regular rhythm.      Heart sounds: No murmur heard.  Pulmonary:      Effort: No respiratory distress.      Breath sounds: Normal breath sounds. No wheezing or rales.   Abdominal:      General: Bowel sounds are normal. There is no distension.      Palpations: Abdomen is soft.      Tenderness: There is no abdominal tenderness.   Musculoskeletal:         General: No deformity. Normal range of motion.      Cervical back: Normal range of motion.   Lymphadenopathy:      Cervical: No cervical adenopathy.   Skin:     General: Skin is warm and dry.      Findings: No rash (advised pt to send photos of the rash).   Neurological:      Mental Status: He is alert and oriented to person, place, and time.      Cranial Nerves: No cranial nerve deficit.      Sensory: No sensory deficit.      Coordination: Coordination normal.   Psychiatric:         Thought Content: Thought content normal.         Assessment   Plan   1. Encounter for well adult exam without abnormal findings  Age and gender appropriate preventive healthcare addressed.  2. Crohn's disease  Stable on Remicade  3. Depression/OCD  Under the care of his psychiatrist.  Stable on clomipramine,

## 2024-06-27 NOTE — PATIENT INSTRUCTIONS
health. Try to stay connected with friends, family, and community, and find ways to manage stress.     If you're feeling depressed or hopeless, talk to someone. A counselor can help. If you don't have a counselor, talk to your doctor.   Talk to your doctor if you think you may have a problem with alcohol or drug use. This includes prescription medicines, marijuana, and other drugs.     Avoid tobacco and nicotine: Don't smoke, vape, or chew. If you need help quitting, talk to your doctor.   Practice safer sex. Getting tested, using condoms or dental dams, and limiting sex partners can help prevent STIs.     Use birth control if it's important to you to prevent pregnancy. Talk with your doctor about your choices and what might be best for you.   Prevent problems where you can. Protect your skin from too much sun, wash your hands, brush your teeth twice a day, and wear a seat belt in the car.   Where can you learn more?  Go to https://www.[employer].cn.net/patientEd and enter P072 to learn more about \"Well Visit, Ages 18 to 65: Care Instructions.\"  Current as of: August 6, 2023  Content Version: 14.1  © 9862-8589 [employer].   Care instructions adapted under license by [employer]. If you have questions about a medical condition or this instruction, always ask your healthcare professional. [employer] disclaims any warranty or liability for your use of this information.

## 2024-07-02 ASSESSMENT — ENCOUNTER SYMPTOMS
TROUBLE SWALLOWING: 0
SHORTNESS OF BREATH: 0
RHINORRHEA: 0
DIARRHEA: 0
COUGH: 0
NAUSEA: 0
SORE THROAT: 0
ABDOMINAL PAIN: 0

## 2025-02-19 ENCOUNTER — OFFICE VISIT (OUTPATIENT)
Dept: INTERNAL MEDICINE CLINIC | Age: 40
End: 2025-02-19
Payer: COMMERCIAL

## 2025-02-19 VITALS
SYSTOLIC BLOOD PRESSURE: 112 MMHG | WEIGHT: 199 LBS | HEART RATE: 107 BPM | OXYGEN SATURATION: 97 % | TEMPERATURE: 97.2 F | DIASTOLIC BLOOD PRESSURE: 74 MMHG | BODY MASS INDEX: 30.26 KG/M2

## 2025-02-19 DIAGNOSIS — K50.919 CROHN'S DISEASE WITH COMPLICATION, UNSPECIFIED GASTROINTESTINAL TRACT LOCATION (HCC): ICD-10-CM

## 2025-02-19 DIAGNOSIS — H57.89 REDNESS OF LEFT EYE: Primary | ICD-10-CM

## 2025-02-19 PROCEDURE — 99213 OFFICE O/P EST LOW 20 MIN: CPT | Performed by: INTERNAL MEDICINE

## 2025-02-19 RX ORDER — TOBRAMYCIN AND DEXAMETHASONE 3; 1 MG/ML; MG/ML
1 SUSPENSION/ DROPS OPHTHALMIC
Qty: 2.5 ML | Refills: 0 | Status: SHIPPED | OUTPATIENT
Start: 2025-02-19 | End: 2025-03-01

## 2025-02-19 SDOH — ECONOMIC STABILITY: FOOD INSECURITY: WITHIN THE PAST 12 MONTHS, THE FOOD YOU BOUGHT JUST DIDN'T LAST AND YOU DIDN'T HAVE MONEY TO GET MORE.: NEVER TRUE

## 2025-02-19 SDOH — ECONOMIC STABILITY: FOOD INSECURITY: WITHIN THE PAST 12 MONTHS, YOU WORRIED THAT YOUR FOOD WOULD RUN OUT BEFORE YOU GOT MONEY TO BUY MORE.: NEVER TRUE

## 2025-02-19 ASSESSMENT — PATIENT HEALTH QUESTIONNAIRE - PHQ9
SUM OF ALL RESPONSES TO PHQ QUESTIONS 1-9: 0
SUM OF ALL RESPONSES TO PHQ9 QUESTIONS 1 & 2: 0
SUM OF ALL RESPONSES TO PHQ QUESTIONS 1-9: 0
1. LITTLE INTEREST OR PLEASURE IN DOING THINGS: NOT AT ALL
SUM OF ALL RESPONSES TO PHQ QUESTIONS 1-9: 0
SUM OF ALL RESPONSES TO PHQ QUESTIONS 1-9: 0
2. FEELING DOWN, DEPRESSED OR HOPELESS: NOT AT ALL

## 2025-02-19 NOTE — PROGRESS NOTES
Emeli Worthy (:  1985) is a 40 y.o. male, here for evaluation of the following chief complaint(s):    Conjunctivitis (Patient feels he had \"pink eye\" x 2 weeks ago & neighbor (PA-C) gave him Rx drops..4:35 PM reappeared. )      ASSESSMENT/PLAN:  1. Redness of left eye  Possible viral cause for pink eye, but also may be uveitis with his history of Crohn's, ankylosing spondylitis.  Sent Tobradex eye drops if symptoms persist or worsen and encouraged [facility] to further evaluate   -     Mariah Duong MD, [facility]  2. Crohn's disease with complication, unspecified gastrointestinal tract location ([facility])  -     Mariah Duong MD, [facility]      Return if symptoms worsen or fail to improve.    SUBJECTIVE/OBJECTIVE:  HPI  Patient reports an episode of pink eye in December. Got drops from a PA for conjunctivitis. Noted very red left eye again this morning.    One of kids had high fever a few days ago.   Patient has had mild cough.    Denies eye pain or vision changes.    Review of Systems    Past Medical History:   Diagnosis Date    Allergic rhinitis     Ankylosing spondylitis of lumbosacral region ([facility]) 2018    improved    Anxiety     OCD    COVID-19 2021    Crohn's disease ([facility])     dx'd approx - Kreines    Depression     Fatty liver 2020    GERD (gastroesophageal reflux disease)     OCD (obsessive compulsive disorder)     dr agustina winchester    Small bowel obstruction ([facility])        Current Outpatient Medications   Medication Sig Dispense Refill    tobramycin-dexAMETHasone (TOBRADEX) 0.3-0.1 % ophthalmic suspension Place 1 drop into the left eye every 4 hours (while awake) for 10 days 2.5 mL 0    cyanocobalamin 1000 MCG tablet Take 1 tablet by mouth daily 90 tablet 1    inFLIXimab (REMICADE) 100 MG injection Infuse 40 mLs intravenously See Admin

## 2025-02-19 NOTE — PATIENT INSTRUCTIONS
Appointments are scheduled within 72 hours or as appropriate for more acute cases. Appointments can be scheduled by calling ’s main number at [phone].     Urgent eye clinic at [facility]    Or see Dr Taylor at [facility]

## 2025-04-01 ENCOUNTER — OFFICE VISIT (OUTPATIENT)
Dept: INTERNAL MEDICINE CLINIC | Age: 40
End: 2025-04-01
Payer: COMMERCIAL

## 2025-04-01 VITALS
OXYGEN SATURATION: 95 % | BODY MASS INDEX: 30.29 KG/M2 | HEART RATE: 107 BPM | SYSTOLIC BLOOD PRESSURE: 108 MMHG | DIASTOLIC BLOOD PRESSURE: 88 MMHG | WEIGHT: 199.2 LBS

## 2025-04-01 DIAGNOSIS — H93.11 TINNITUS OF RIGHT EAR: Primary | ICD-10-CM

## 2025-04-01 DIAGNOSIS — H61.21 IMPACTED CERUMEN OF RIGHT EAR: ICD-10-CM

## 2025-04-01 PROCEDURE — 99213 OFFICE O/P EST LOW 20 MIN: CPT | Performed by: INTERNAL MEDICINE

## 2025-04-01 RX ORDER — PREDNISOLONE ACETATE 10 MG/ML
1 SUSPENSION/ DROPS OPHTHALMIC DAILY
COMMUNITY
Start: 2025-03-05

## 2025-04-01 NOTE — PROGRESS NOTES
Emeli Worthy (:  1985) is a 40 y.o. male, here for evaluation of the following chief complaint(s):    Ears (Pain and sensitive to noise, ringing on and off. Tranfers back and fourth from left ear to right. Tylenol last night and thinks it helped a little bit)      ASSESSMENT/PLAN:  1. Tinnitus of right ear -patient will let me know if it does not resolve after cerumen disimpaction.  We did discuss masking techniques, B vitamins, bioflavonoids.  If symptoms persist, may refer to ENT  2. Impacted cerumen of right ear  Resolved with irrigation    Return in about 3 months (around 2025) for butr.    SUBJECTIVE/OBJECTIVE:  HPI  Patient notes fullness of his right ear and ear pain.  Also notes ear ringing.  He states loud noises make it worse.  He denies headache or dizziness.  He denies fever or sore throat.  Symptoms have been worse over the past day.    He also notes his inflammation labs have been elevated.  His gastroenterologist may want to change his Crohn's medication.    His iritis is improved with prednisolone eyedrops.      Review of Systems    Past Medical History:   Diagnosis Date    Allergic rhinitis     Ankylosing spondylitis of lumbosacral region ([facility]) 2018    improved    Anxiety     OCD    COVID-19 2021    hla b27 pos    Crohn's disease ([facility])     dx'd approx - Kreines    Depression     Fatty liver 2020    GERD (gastroesophageal reflux disease)     OCD (obsessive compulsive disorder)     dr agustina winchester    Small bowel obstruction ([facility])     Uveitis        Current Outpatient Medications   Medication Sig Dispense Refill    prednisoLONE acetate (PRED FORTE) 1 % ophthalmic suspension Place 1 drop into the left eye daily      cyanocobalamin 1000 MCG tablet Take 1 tablet by mouth daily 90 tablet 1    inFLIXimab (REMICADE) 100 MG injection Infuse 40 mLs intravenously See Admin Instructions 1 each 4    Cholecalciferol (VITAMIN D3) 79563 units CAPS Take 1 capsule by mouth every

## 2025-04-03 ENCOUNTER — TELEPHONE (OUTPATIENT)
Dept: INTERNAL MEDICINE CLINIC | Age: 40
End: 2025-04-03

## 2025-04-03 NOTE — TELEPHONE ENCOUNTER
----- Message from Bryant LEWIS sent at 4/3/2025  8:41 AM EDT -----  Regarding: [facility] Appointment Request  [facility] Appointment Request    Patient needs appointment for [facility] Appointment Type: New to Provider.    Patient Requested Dates(s):the week of mathew 8  Patient Requested Time:early morning  Provider Name:Tj Howell DO      Reason for Appointment Request: New Patient - Requested Provider unavailable and the pcp of the patient told doctor Tj Howell DO about the patient    --------------------------------------------------------------------------------------------------------------------------    Relationship to Patient: Spouse/Partner/Chasya    Call Back Information: OK to leave message on voicemail  Preferred Call Back Number:[phone]

## 2025-07-06 SDOH — HEALTH STABILITY: PHYSICAL HEALTH: ON AVERAGE, HOW MANY MINUTES DO YOU ENGAGE IN EXERCISE AT THIS LEVEL?: 0 MIN

## 2025-07-06 SDOH — HEALTH STABILITY: PHYSICAL HEALTH: ON AVERAGE, HOW MANY DAYS PER WEEK DO YOU ENGAGE IN MODERATE TO STRENUOUS EXERCISE (LIKE A BRISK WALK)?: 0 DAYS

## 2025-07-08 ENCOUNTER — OFFICE VISIT (OUTPATIENT)
Dept: INTERNAL MEDICINE CLINIC | Age: 40
End: 2025-07-08
Payer: COMMERCIAL

## 2025-07-08 VITALS
SYSTOLIC BLOOD PRESSURE: 102 MMHG | WEIGHT: 196 LBS | DIASTOLIC BLOOD PRESSURE: 70 MMHG | BODY MASS INDEX: 29.7 KG/M2 | TEMPERATURE: 97.2 F | OXYGEN SATURATION: 95 % | HEART RATE: 90 BPM | HEIGHT: 68 IN

## 2025-07-08 DIAGNOSIS — K50.919 CROHN'S DISEASE WITH COMPLICATION, UNSPECIFIED GASTROINTESTINAL TRACT LOCATION (HCC): ICD-10-CM

## 2025-07-08 DIAGNOSIS — Z11.59 NEED FOR HEPATITIS C SCREENING TEST: ICD-10-CM

## 2025-07-08 DIAGNOSIS — Z13.1 SCREENING FOR DIABETES MELLITUS: ICD-10-CM

## 2025-07-08 DIAGNOSIS — Z00.00 ANNUAL PHYSICAL EXAM: Primary | ICD-10-CM

## 2025-07-08 DIAGNOSIS — Z13.220 SCREENING FOR HYPERLIPIDEMIA: ICD-10-CM

## 2025-07-08 DIAGNOSIS — Z01.84 IMMUNITY STATUS TESTING: ICD-10-CM

## 2025-07-08 PROCEDURE — 99395 PREV VISIT EST AGE 18-39: CPT | Performed by: INTERNAL MEDICINE

## 2025-07-08 NOTE — PATIENT INSTRUCTIONS
Preventive plan of care for Emeli Worthy        7/8/2025           Preventive Measures Status       Recommendations for screening   Prostate Cancer Screen  No results found for: \"PSA\"   This test is not clinically indicated    Colon Cancer Screen  Last colonoscopy: 8/22/2022 Repeat every 3 years.  Based on last colonoscopy report, next colonoscopy due in August 2025   Diabetes Screen  Glucose (mg/dL)   Date Value   07/11/2018 94    Test recommended and ordered   Cholesterol Screen  Lab Results   Component Value Date    CHOL 174 03/09/2022    TRIG 128 03/09/2022    HDL 48 03/09/2022    Test recommended and ordered   Hepatitis C screening: Not on file   Recommended for patients between the ages of 18-79-test recommended and ordered   HIV screening: Not on file Recommended for patients between the ages of 15-65 who have never been tested regardless of risk--declined   Aspirin for Cardiovascular Prevention   No Not indicated    Recommended Immunizations    Immunization History   Administered Date(s) Administered    COVID-19, PFIZER PURPLE top, DILUTE for use, (age 12 y+), 30mcg/0.3mL 01/18/2021, 10/05/2021    PPD Test 08/12/2016    TDaP, ADACEL (age 10y-64y), BOOSTRIX (age 10y+), IM, 0.5mL 11/30/2016    Influenza vaccine: recommended every fall     Pneumonia vaccine: Due at age 65    Shingles vaccine: recommended as a one time dose after the age of 60    Tetanus vaccine: tetanus and diptheria/pertussis vaccine (Td/Tdap) recommended every 10 years- due 11/30/2026    COVID-19 vaccine-recommended     Varicella vaccine--status unknown.  Antibodies ordered.  You are not a candidate for varicella vaccine if not immune    Hepatitis B vaccine-status unknown.  Antibodies ordered           Additional Recommendations   1. Use Sunscreen daily when exposed to the sun to reduce the risk of skin cancer.    2. Continue a healthy lifestyle including a healthy diet and aerobic exercise  3. Always wear a seat belt while in a

## 2025-07-08 NOTE — PROGRESS NOTES
NL.  Regular rhythm.   No murmur/clicks/rubs.  No ectopy.  PMI is non-displaced.  VASC:  Pedal pulses 2/4.  Carotid upstrokes 2+.  No bruits noted.    PULM:  Lungs are CTA.  Symmetric breath sounds noted.  AP Diameter NL.  GI:  Abdomen is soft and nontender.  No distension.  No organomegaly.  No masses.  No pulsatile masses.    EXT:  No Cyanosis or clubbing.  No edema.    SKIN: Warm and dry, normal turgor, no rash or lesions of concern.  NEURO: No lateralizing or focal deficits.  Reflexes symmetric.  Moves all extremities symmetrically.  No ataxia.  MS:  No C/T/L paraspinal tenderness.  No scoliosis.  No joint effusions.  Full joint ROM.  No synovitis  PSYCH: Mood is slightly anxious.  Affect normal.  Judgement and insight NL.        ASSESSMENT/PLAN:    1. Annual physical exam  All care gaps identified and addressed  Immunity status testing for hepatitis B and varicella  Additional lab test for diabetes screening and cholesterol screening  Colorectal cancer screening is due in August 2025.  Advised patient to check with his gastroenterologist.  - CBC with Auto Differential; Future  - Lipid Panel; Future  - Hemoglobin A1C; Future  - TSH; Future  - Hepatitis C Antibody; Future  - VARICELLA ZOSTER ANTIBODY, IGG; Future  - Hepatitis B Surface Antibody; Future    2. Crohn's disease with complication, unspecified gastrointestinal tract location (HCC)  Asymptomatic at this time  Request records to review MRE and office notes and LAB.   - CBC with Auto Differential; Future  - Hepatitis B Surface Antibody; Future    3. Need for hepatitis C screening test    - Hepatitis C Antibody; Future    4. Immunity status testing    - VARICELLA ZOSTER ANTIBODY, IGG; Future  - Hepatitis B Surface Antibody; Future    5. Screening for hyperlipidemia    - Lipid Panel; Future    6. Screening for diabetes mellitus    - Hemoglobin A1C; Future               Preventive plan of care for Emeli Worthy        7/8/2025           Preventive